# Patient Record
Sex: FEMALE | Race: BLACK OR AFRICAN AMERICAN | NOT HISPANIC OR LATINO | Employment: UNEMPLOYED | URBAN - METROPOLITAN AREA
[De-identification: names, ages, dates, MRNs, and addresses within clinical notes are randomized per-mention and may not be internally consistent; named-entity substitution may affect disease eponyms.]

---

## 2019-10-17 ENCOUNTER — TELEPHONE (OUTPATIENT)
Dept: OBSTETRICS AND GYNECOLOGY | Facility: CLINIC | Age: 44
End: 2019-10-17

## 2019-10-17 NOTE — TELEPHONE ENCOUNTER
----- Message from Inocencia Hood sent at 10/17/2019  1:18 PM CDT -----  Contact: CORRINA BROWN [[id number]]  Name of Who is Calling:CORRINA BROWN [[id number]]      What is the request in detail:Pt is calling to get appt with dr Sandoval as new patient/ well women tried to schedule but was not able       Can the clinic reply by [facility]:      What Number to Call Back if not in [facility]:[phone]

## 2019-10-17 NOTE — TELEPHONE ENCOUNTER
I spoke to the pt and gave her the date and time of her appt . Pt was told that she was placed on the hold list if anything comes open sooner she can call to get that appt.

## 2019-11-20 ENCOUNTER — TELEPHONE (OUTPATIENT)
Dept: OBSTETRICS AND GYNECOLOGY | Facility: CLINIC | Age: 44
End: 2019-11-20